# Patient Record
Sex: FEMALE | Race: WHITE | NOT HISPANIC OR LATINO | Employment: FULL TIME | URBAN - METROPOLITAN AREA
[De-identification: names, ages, dates, MRNs, and addresses within clinical notes are randomized per-mention and may not be internally consistent; named-entity substitution may affect disease eponyms.]

---

## 2018-09-29 ENCOUNTER — SEE NOTE (OUTPATIENT)
Dept: URBAN - METROPOLITAN AREA CLINIC 34 | Facility: CLINIC | Age: 33
Setting detail: DERMATOLOGY
End: 2018-09-29

## 2018-09-29 PROCEDURE — 99202 OFFICE O/P NEW SF 15 MIN: CPT

## 2018-09-29 PROCEDURE — 11100 BX SKIN SUBCUTANEOUS&/MUCOUS MEMBRANE 1 LESION: CPT

## 2020-09-11 ENCOUNTER — OFFICE VISIT (OUTPATIENT)
Dept: URBAN - METROPOLITAN AREA CLINIC 25 | Facility: CLINIC | Age: 35
End: 2020-09-11

## 2020-09-25 ENCOUNTER — DASHBOARD ENCOUNTERS (OUTPATIENT)
Age: 35
End: 2020-09-25

## 2020-09-25 ENCOUNTER — OFFICE VISIT (OUTPATIENT)
Dept: URBAN - METROPOLITAN AREA CLINIC 25 | Facility: CLINIC | Age: 35
End: 2020-09-25
Payer: COMMERCIAL

## 2020-09-25 VITALS
WEIGHT: 126 LBS | BODY MASS INDEX: 22.32 KG/M2 | DIASTOLIC BLOOD PRESSURE: 79 MMHG | HEIGHT: 63 IN | RESPIRATION RATE: 16 BRPM | TEMPERATURE: 97.8 F | HEART RATE: 62 BPM | SYSTOLIC BLOOD PRESSURE: 129 MMHG

## 2020-09-25 DIAGNOSIS — K64.8 INTERNAL HEMORRHOIDS WITH BLEEDING: ICD-10-CM

## 2020-09-25 PROCEDURE — G9903 PT SCRN TBCO ID AS NON USER: HCPCS | Performed by: INTERNAL MEDICINE

## 2020-09-25 PROCEDURE — 1036F TOBACCO NON-USER: CPT | Performed by: INTERNAL MEDICINE

## 2020-09-25 PROCEDURE — 99203 OFFICE O/P NEW LOW 30 MIN: CPT | Performed by: INTERNAL MEDICINE

## 2020-09-25 PROCEDURE — G8427 DOCREV CUR MEDS BY ELIG CLIN: HCPCS | Performed by: INTERNAL MEDICINE

## 2020-09-25 PROCEDURE — G8420 CALC BMI NORM PARAMETERS: HCPCS | Performed by: INTERNAL MEDICINE

## 2020-09-25 RX ORDER — MINOCYCLINE HYDROCHLORIDE 90 MG/1
AS DIRECTED TABLET, EXTENDED RELEASE ORAL
Status: ACTIVE | COMMUNITY

## 2020-09-25 RX ORDER — LEVONORGESTREL AND ETHINYL ESTRADIOL 0.1-0.02MG
1 TABLET KIT ORAL ONCE A DAY
Status: ACTIVE | COMMUNITY

## 2020-09-25 NOTE — HPI-TODAY'S VISIT:
Sept 2020:  No family history of colon cancer / GI malignancies / IBD. symptomatic hemorrhoids since may 2018. periodic flareups. rectal bleeding  only upon wiping when hemorrhoids flareup / periodic hemorrhoid swelling. had atleast 3 hemorrhoid bandings. no prior colonoscopy or flex sig. No Heartburn, Dysphagia, Melena, Weight loss, Diarrhea, Constipation, Abdominal pain. She was advised to undergo hemorrhoid surgery but chose to defer.

## 2020-09-25 NOTE — PHYSICAL EXAM GASTROINTESTINAL
Abdomen , soft, nontender, nondistended , no guarding or rigidity , no masses palpable , normal bowel sounds , Liver and Spleen , no hepatomegaly present , no hepatosplenomegaly , liver nontender , spleen not palpable , Rectal , anoscopy was performed.normal sphincter tone , grade 1internal hemorrhoids, no rectal masses or bleeding present, chaperone present ( isaura). small external hemorrhoids.